# Patient Record
Sex: MALE | Race: WHITE | NOT HISPANIC OR LATINO
[De-identification: names, ages, dates, MRNs, and addresses within clinical notes are randomized per-mention and may not be internally consistent; named-entity substitution may affect disease eponyms.]

---

## 2017-03-02 PROBLEM — Z00.00 ENCOUNTER FOR PREVENTIVE HEALTH EXAMINATION: Status: ACTIVE | Noted: 2017-03-02

## 2017-03-06 ENCOUNTER — APPOINTMENT (OUTPATIENT)
Dept: ORTHOPEDIC SURGERY | Facility: CLINIC | Age: 57
End: 2017-03-06

## 2017-03-06 VITALS
DIASTOLIC BLOOD PRESSURE: 84 MMHG | WEIGHT: 210 LBS | BODY MASS INDEX: 31.1 KG/M2 | HEART RATE: 80 BPM | SYSTOLIC BLOOD PRESSURE: 121 MMHG | HEIGHT: 69 IN

## 2017-03-06 DIAGNOSIS — Z78.9 OTHER SPECIFIED HEALTH STATUS: ICD-10-CM

## 2017-03-06 DIAGNOSIS — Z80.0 FAMILY HISTORY OF MALIGNANT NEOPLASM OF DIGESTIVE ORGANS: ICD-10-CM

## 2017-03-06 DIAGNOSIS — Z87.09 PERSONAL HISTORY OF OTHER DISEASES OF THE RESPIRATORY SYSTEM: ICD-10-CM

## 2017-03-06 DIAGNOSIS — E78.00 PURE HYPERCHOLESTEROLEMIA, UNSPECIFIED: ICD-10-CM

## 2017-03-06 RX ORDER — OMEPRAZOLE MAGNESIUM 10 MG/1
GRANULE, DELAYED RELEASE ORAL
Refills: 0 | Status: ACTIVE | COMMUNITY

## 2017-03-06 RX ORDER — ALBUTEROL SULFATE 90 UG/1
108 (90 BASE) AEROSOL, METERED RESPIRATORY (INHALATION)
Refills: 0 | Status: ACTIVE | COMMUNITY

## 2017-03-06 RX ORDER — ATORVASTATIN CALCIUM 80 MG/1
TABLET, FILM COATED ORAL
Refills: 0 | Status: ACTIVE | COMMUNITY

## 2017-03-06 RX ORDER — FLUTICASONE FUROATE AND VILANTEROL TRIFENATATE 100; 25 UG/1; UG/1
100-25 POWDER RESPIRATORY (INHALATION)
Refills: 0 | Status: ACTIVE | COMMUNITY

## 2017-05-18 ENCOUNTER — APPOINTMENT (OUTPATIENT)
Dept: SURGERY | Facility: CLINIC | Age: 57
End: 2017-05-18

## 2017-05-18 VITALS
HEIGHT: 69 IN | RESPIRATION RATE: 15 BRPM | BODY MASS INDEX: 29.62 KG/M2 | DIASTOLIC BLOOD PRESSURE: 89 MMHG | OXYGEN SATURATION: 96 % | HEART RATE: 62 BPM | TEMPERATURE: 98.2 F | SYSTOLIC BLOOD PRESSURE: 131 MMHG | WEIGHT: 200 LBS

## 2017-05-18 DIAGNOSIS — J45.909 UNSPECIFIED ASTHMA, UNCOMPLICATED: ICD-10-CM

## 2017-05-18 DIAGNOSIS — R19.8 OTHER SPECIFIED SYMPTOMS AND SIGNS INVOLVING THE DIGESTIVE SYSTEM AND ABDOMEN: ICD-10-CM

## 2017-05-18 DIAGNOSIS — E78.00 PURE HYPERCHOLESTEROLEMIA, UNSPECIFIED: ICD-10-CM

## 2017-05-18 DIAGNOSIS — K64.2 THIRD DEGREE HEMORRHOIDS: ICD-10-CM

## 2017-05-18 RX ORDER — METHYLPREDNISOLONE 4 MG/1
4 TABLET ORAL
Qty: 1 | Refills: 1 | Status: DISCONTINUED | COMMUNITY
Start: 2017-03-06 | End: 2017-05-18

## 2017-08-29 ENCOUNTER — OUTPATIENT (OUTPATIENT)
Dept: OUTPATIENT SERVICES | Facility: HOSPITAL | Age: 57
LOS: 1 days | End: 2017-08-29
Payer: COMMERCIAL

## 2017-08-29 DIAGNOSIS — M54.16 RADICULOPATHY, LUMBAR REGION: ICD-10-CM

## 2017-08-29 PROCEDURE — 62323 NJX INTERLAMINAR LMBR/SAC: CPT

## 2017-08-29 PROCEDURE — 77003 FLUOROGUIDE FOR SPINE INJECT: CPT

## 2017-09-06 ENCOUNTER — APPOINTMENT (OUTPATIENT)
Dept: SURGERY | Facility: CLINIC | Age: 57
End: 2017-09-06

## 2017-09-28 ENCOUNTER — TRANSCRIPTION ENCOUNTER (OUTPATIENT)
Age: 57
End: 2017-09-28

## 2018-07-23 PROBLEM — K64.2 THIRD DEGREE HEMORRHOIDS: Status: ACTIVE | Noted: 2017-05-18

## 2020-05-20 ENCOUNTER — APPOINTMENT (OUTPATIENT)
Dept: OTOLARYNGOLOGY | Facility: CLINIC | Age: 60
End: 2020-05-20
Payer: COMMERCIAL

## 2020-05-20 VITALS
WEIGHT: 205 LBS | BODY MASS INDEX: 30.36 KG/M2 | DIASTOLIC BLOOD PRESSURE: 84 MMHG | HEIGHT: 69 IN | SYSTOLIC BLOOD PRESSURE: 138 MMHG | HEART RATE: 80 BPM

## 2020-05-20 PROCEDURE — 92557 COMPREHENSIVE HEARING TEST: CPT

## 2020-05-20 PROCEDURE — 99204 OFFICE O/P NEW MOD 45 MIN: CPT | Mod: 25

## 2020-05-20 NOTE — PHYSICAL EXAM
[Midline] : trachea located in midline position [Normal] : no rashes [de-identified] : b/l nathalian

## 2020-05-20 NOTE — HISTORY OF PRESENT ILLNESS
[Tinnitus] : tinnitus [No] : patient does not have a  history of radiation therapy [de-identified] : 58 yo male \par Patient complains of bilateral tinnitus x 1 week. Had COVID march 19, now is negative. States he was watching TV when the ringing started, describes as a constant high pitched ringing. Better when hes in a louder environment worse when its quite. Denies recent loud noise exposure. \par Pt has no ear pain, ear drainage, hearing loss, vertigo, nasal congestion, nasal discharge, epistaxis, sinus infections, facial pain, facial pressure, throat pain, dysphagia or fevers\par \par  [Anxiety] : no anxiety [Headache] : no headache [Dizziness] : no dizziness [Hearing Loss] : no hearing loss [Recurrent Otitis Media] : no recurrent otitis media [Otitis Media with Effusion] : no otitis media with effusion [Presbycusis] : no presbycusis [Congenital Ear Malformation] : no congenital ear malformation [Meniere Disease] : no Meniere disease [Otosclerosis] : no otosclerosis [Perilymphatic Fistula] : no perilymphatic fistula [Hypertension] : no hypertension [Loud Noise Exposure] : no history of loud noise exposure [Early Onset Hearing Loss] : no early onset hearing loss [Smoking] : no smoking [Stroke] : no stroke [Facial Pain] : no facial pain [Facial Pressure] : no facial pressure [Nasal Congestion] : no nasal congestion [Diplopia] : no diplopia [Ear Fullness] : no ear fullness [Allergic Rhinitis] : no allergic rhinitis [Maxillary Tooth Infection] : no maxillary tooth infection [Septal Deviation] : no septal deviation [Environmental Allergies] : no environmental allergies [Seasonal Allergies] : no seasonal allergies [Environmental Allergens] : no environmental allergens [Adenoidectomy] : no adenoidectomy [Nasal FB Removal] : no nasal foreign body removal [Allergies] : no allergies [Asthma] : no asthma [Neck Mass] : no neck mass [Neck Pain] : no neck pain [Chills] : no chills [Cold Intolerance] : no cold intolerance [Cough] : no cough [Fatigue] : no fatigue [Heat Intolerance] : no heat intolerance [Hyperthyroidism] : no hyperthyroidism [Sialadenitis] : no sialadenitis [Hodgkin Disease] : no hodgkin disease [Non-Hodgkin Lymphoma] : no non-hodgkin lymphoma [Tobacco Use] : no tobacco use [Alcohol Use] : no alcohol use [Graves Disease] : no graves disease [Thyroid Cancer] : no thyroid cancer

## 2020-05-20 NOTE — ASSESSMENT
[FreeTextEntry1] : Bilateral Tinnitus:\par -Hearing Test performed to evaluate the extent of hearing loss and to explain pt's symptoms-wnl\par Rec Arches vitamin tx and acupuncture\par stress reduction and avoid loud noises\par \par Wax:\par After informed verbal consent is obtained, cerumen is removed from the right and left  ear canal with a curette and suction.\par \par f/u 6 months

## 2021-01-15 ENCOUNTER — APPOINTMENT (OUTPATIENT)
Dept: OTOLARYNGOLOGY | Facility: CLINIC | Age: 61
End: 2021-01-15
Payer: COMMERCIAL

## 2021-01-15 VITALS — WEIGHT: 205 LBS | BODY MASS INDEX: 30.36 KG/M2 | HEIGHT: 69 IN | TEMPERATURE: 95.6 F

## 2021-01-15 DIAGNOSIS — H61.23 IMPACTED CERUMEN, BILATERAL: ICD-10-CM

## 2021-01-15 DIAGNOSIS — H69.83 OTHER SPECIFIED DISORDERS OF EUSTACHIAN TUBE, BILATERAL: ICD-10-CM

## 2021-01-15 DIAGNOSIS — H90.3 SENSORINEURAL HEARING LOSS, BILATERAL: ICD-10-CM

## 2021-01-15 DIAGNOSIS — H93.13 TINNITUS, BILATERAL: ICD-10-CM

## 2021-01-15 PROCEDURE — 99072 ADDL SUPL MATRL&STAF TM PHE: CPT

## 2021-01-15 PROCEDURE — 92557 COMPREHENSIVE HEARING TEST: CPT

## 2021-01-15 PROCEDURE — 99214 OFFICE O/P EST MOD 30 MIN: CPT | Mod: 25

## 2021-01-15 PROCEDURE — 92567 TYMPANOMETRY: CPT

## 2021-01-15 NOTE — HISTORY OF PRESENT ILLNESS
[de-identified] : co au ringing high pitch ring\par constant, denies hearing loss\par neg hx noise\par co excessive throat clearing no dysphagia or\par daily rilosec for gerd, no pnd

## 2021-01-15 NOTE — PHYSICAL EXAM
[Midline] : trachea located in midline position [Normal] : no rashes [de-identified] : millie cleared au

## 2021-01-15 NOTE — ASSESSMENT
[FreeTextEntry1] : cerumen cleared \par audio au sn loss 8000 hz source of tinnitus\par rec masking\par fu audio 1 y

## 2021-11-03 ENCOUNTER — APPOINTMENT (OUTPATIENT)
Dept: NEUROSURGERY | Facility: CLINIC | Age: 61
End: 2021-11-03
Payer: COMMERCIAL

## 2021-11-03 VITALS
TEMPERATURE: 97.3 F | WEIGHT: 210 LBS | HEART RATE: 76 BPM | BODY MASS INDEX: 31.1 KG/M2 | SYSTOLIC BLOOD PRESSURE: 145 MMHG | DIASTOLIC BLOOD PRESSURE: 94 MMHG | HEIGHT: 69 IN | OXYGEN SATURATION: 98 %

## 2021-11-03 PROCEDURE — 99203 OFFICE O/P NEW LOW 30 MIN: CPT

## 2021-11-03 NOTE — REASON FOR VISIT
[New Patient Visit] : a new patient visit [FreeTextEntry1] : previous lumbar laminectomy - persistent pain and right sciatica.

## 2021-11-03 NOTE — CONSULT LETTER
[Dear  ___] : Dear  [unfilled], [Courtesy Letter:] : I had the pleasure of seeing your patient, [unfilled], in my office today. [Sincerely,] : Sincerely, [FreeTextEntry2] : Chad Arauz MD\par [address] [place] [address], [address]\[place] [address] [FreeTextEntry1] : This very pleasant 61-year-old gentleman is seen today for persistent mechanical lower back pain associated with severe right-sided sciatica.  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  Around 2015 he began developing more significant lower back issues with episodes of sciatica.  He underwent appropriate conservative measures for an extended period of time including physical therapy, pain management, and strategic epidural steroid injections.  He was identified to have severe degenerative spondylotic changes at L4-5 and L5-S1 is the underlying cause of his symptoms.  Ultimately he underwent a minimally invasive right-sided hemilaminectomy to decompress L4-5 and L5-S1 at [facility] surgery in 2019.  Unfortunately, the patient has not received significant benefit from that surgery and has in fact some increased lower back pain.  The patient has recently been seen pain management and further epidural steroid injections have not had any effect.  The patient is now very limited in his standing and walking ability and endurance.  He has to sit and stand with a forward bend in his back in order to ambulate comfortably.  He denies any bowel or bladder dysfunction.\par \par I have reviewed with the patient directly his postoperative MRI scan which was performed back in February 2020 at [facility].  This identifies the hemilaminectomy on the right-hand side at L4-5 and L5-S1.  There is good central decompression.  Of most concern there is splaying of the facet joint at L4-5 and L5-S1.  There are significant Modic changes in the endplates of L4 and L5 suggestive of some dynamic instability.  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  The previous decompression did not extend lateral into the foramen sufficiently to decompress the nerves.\par \par On examination the patient is clearly in discomfort.  He sits forward in a bent posture.  He has a forward stooped position when standing.  The patient has radiating sciatic pain down the right leg down to the knee region.  Fortunately, there is good strength of plantar and dorsi flexion.  The patient indicates that along with the pain he will have intermittent numbness in a distribution involving the lateral calf and top of foot in particular.\par \par I have had an extended discussion with the patient regarding his treatment options.  Based on the imaging from over 18 months ago there is certainly persistent compression of the nerve roots bilaterally in the foramen despite the medial laminectomy approach.  I have pointed out that the only way to fully decompress the nerve roots in this spondylotic facet hypertrophy pattern is to do a facetectomy which would by definition because the patient's spine to be unstable.  The most appropriate surgical approach in my opinion would be to do a open transforaminal lumbar interbody fusion at both L4-5 and L5-S1 with pedicle screw and anil instrumentation in addition to bilateral total facetectomies to completely decompress the nerve roots.  The patient indicated good understanding of my descriptions and explanations.  Currently he would like to think a little bit more about his options and possibly seek an additional opinion.  I think that this is quite appropriate.  A prescription for an updated MRI scan has been provided in order to better understand the possible changes in anatomy over the last 18months and how that relates to his current symptoms before making a final surgery decision.  I will see the patient again after he is undergone further imaging.  Overall the patient anticipates that he would like to undergo surgery in the new year if he makes that decision.\par \par Thank you for very kindly including me in the evaluation and support of your patient.  Please do not hesitate to contact me should you have any questions or concerns regarding this evaluation, the patient's diagnosis, or the recommendation for consideration of decompression with fusion of the lumbar spine at L4-5 and L5-S1. [FreeTextEntry3] : Arturo Quiñones MD, PhD, FRCPSC \par Attending Neurosurgeon \par  of Neurosurgery \par [facility] \par [address], 2nd floor \[place] [address] \par Office: [phone] \par Fax: [phone]\par \par  [DrTano  ___] : Dr. BROWNING

## 2022-01-14 ENCOUNTER — APPOINTMENT (OUTPATIENT)
Dept: OTOLARYNGOLOGY | Facility: CLINIC | Age: 62
End: 2022-01-14

## 2022-03-07 ENCOUNTER — APPOINTMENT (OUTPATIENT)
Dept: NEUROSURGERY | Facility: CLINIC | Age: 62
End: 2022-03-07

## 2022-03-07 VITALS
DIASTOLIC BLOOD PRESSURE: 88 MMHG | WEIGHT: 210 LBS | BODY MASS INDEX: 31.1 KG/M2 | HEART RATE: 77 BPM | HEIGHT: 69 IN | TEMPERATURE: 97.8 F | OXYGEN SATURATION: 98 % | SYSTOLIC BLOOD PRESSURE: 155 MMHG

## 2022-03-07 PROCEDURE — XXXXX: CPT | Mod: 1L

## 2022-03-07 NOTE — CONSULT LETTER
[Dear  ___] : Dear  [unfilled], [Courtesy Letter:] : I had the pleasure of seeing your patient, [unfilled], in my office today. [Sincerely,] : Sincerely, [FreeTextEntry2] : Chad Arauz MD\par [address]\[place] [address] [FreeTextEntry3] : Arturo Quiñones MD, PhD, FRCPSC \par Attending Neurosurgeon \par  of Neurosurgery \par [facility] \par [address], [address]nd floor \[place] [address] \par Office: [phone] \par Fax: [phone]\par \par

## 2022-10-29 ENCOUNTER — NON-APPOINTMENT (OUTPATIENT)
Age: 62
End: 2022-10-29

## 2022-12-08 ENCOUNTER — APPOINTMENT (OUTPATIENT)
Dept: SURGERY | Facility: CLINIC | Age: 62
End: 2022-12-08

## 2022-12-08 VITALS
SYSTOLIC BLOOD PRESSURE: 147 MMHG | RESPIRATION RATE: 17 BRPM | WEIGHT: 210 LBS | OXYGEN SATURATION: 98 % | DIASTOLIC BLOOD PRESSURE: 79 MMHG | BODY MASS INDEX: 31.1 KG/M2 | TEMPERATURE: 96.8 F | HEIGHT: 69 IN | HEART RATE: 87 BPM

## 2022-12-08 DIAGNOSIS — K64.8 OTHER HEMORRHOIDS: ICD-10-CM

## 2022-12-08 DIAGNOSIS — R19.4 CHANGE IN BOWEL HABIT: ICD-10-CM

## 2022-12-08 DIAGNOSIS — Z63.5 DISRUPTION OF FAMILY BY SEPARATION AND DIVORCE: ICD-10-CM

## 2022-12-08 PROCEDURE — 46600 DIAGNOSTIC ANOSCOPY SPX: CPT

## 2022-12-08 RX ORDER — NIRMATRELVIR AND RITONAVIR 300-100 MG
20 X 150 MG & KIT ORAL
Qty: 30 | Refills: 0 | Status: DISCONTINUED | COMMUNITY
Start: 2022-10-30

## 2022-12-08 RX ORDER — LOSARTAN POTASSIUM 100 MG/1
100 TABLET, FILM COATED ORAL
Qty: 90 | Refills: 0 | Status: ACTIVE | COMMUNITY
Start: 2022-11-18

## 2022-12-08 RX ORDER — LOSARTAN POTASSIUM 50 MG/1
50 TABLET, FILM COATED ORAL
Qty: 90 | Refills: 0 | Status: DISCONTINUED | COMMUNITY
Start: 2022-02-03

## 2022-12-08 RX ORDER — EZETIMIBE 10 MG/1
10 TABLET ORAL
Qty: 90 | Refills: 0 | Status: ACTIVE | COMMUNITY
Start: 2022-03-01

## 2022-12-08 RX ORDER — DESONIDE 0.5 MG/G
0.05 OINTMENT TOPICAL
Qty: 15 | Refills: 0 | Status: ACTIVE | COMMUNITY
Start: 2022-11-18

## 2022-12-08 RX ORDER — ROSUVASTATIN CALCIUM 20 MG/1
20 TABLET, FILM COATED ORAL
Qty: 90 | Refills: 0 | Status: ACTIVE | COMMUNITY
Start: 2022-02-03

## 2022-12-08 SDOH — SOCIAL STABILITY - SOCIAL INSECURITY: DISRUPTION OF FAMILY BY SEPARATION AND DIVORCE: Z63.5

## 2022-12-08 NOTE — HISTORY OF PRESENT ILLNESS
[FreeTextEntry1] : Juan A is a 61 y/o male here for a consultation visit, possible hemorrhoids. \par \par Colonoscopy on 10/29/20 by Dr. Yanez - 1. Five sessile polyps ranging between 3-7mm in size were found in the ascending colon and at cecum; polypectomy was performed with a cold snare. 2. Three polyps removed with cold bx forceps;; two removed with cold snare. 3. Two sessile polyps ranging between 5-9mm in size were found at the hepatic flexure; polypectomy was performed with a cold snare. 4. Retroflexed views revealed Internal Grade I first degree hemorrhoids. Pathology - A. Colon, cecum polyps, biopsy: tubular adenomas (x3). B. Colon, hepatic flexure polyps, biopsy: tubular adenoma (x2). \par \par Last seen on 5/18/17 - Perianal inspection demonstrated prolapse of the hypertrophied papilla in the left lateral position. Digital exam revealed large internal hemorrhoids and hypertrophied papillae. This was confirmed on anoscopy. Patient with large prolapsing internal hemorrhoids and associated hypertrophied papillae. Unfortunately the only treatment option for this combined pathology is surgery and I reviewed the indications, risks, benefits, alternatives for hemorrhoidectomy including but not limited to bleeding, infection, recurrence, pain, tags, fissure, fistula, stenosis, and anal incontinence. All questions were answered. Since the patient has had a significant change in bowel habits I called Dr. Yanez to make arrangements for a colonoscopy prior to hemorrhoid surgery. Patient understands the need for the colonoscopy.\par \par Today pt reports no pain. Daily BMs, formed ("flat" shape), Metamucil daily with relief, doesn’t feel like pt had complete evacuation of stool for about an year, no straining, denies pain, no bleeding, no episodes of incontinence, and denies feeling swollen or prolapsed tissue. Does feel bloated.  Denies nausea and vomiting. Denies fever and chills. Good appetite. Not taking any anticoagulants.

## 2022-12-08 NOTE — PHYSICAL EXAM
[FreeTextEntry1] : Perianal inspection left posterior tag.  Digital exam hemorrhoid enlargement with hypertrophied papillae.  Anoscopy confirmed enlarged hemorrhoids with hypertrophied papillae.\par \par Anoscopy performed to evaluate internal hemorrhoids.  No sedation required.\par

## 2022-12-08 NOTE — ASSESSMENT
[FreeTextEntry1] : Patient has not had anal swelling bleeding or pain for at least a year or 2.  However, he has had a change in bowel habits.  His stool is thinner and flatter than it used to be.  He takes daily Metamucil and I suggested that he continue.  He has hemorrhoid enlargement.  Typically, enlarged hemorrhoids do not result in change in bowel habits  unless there is associated swelling and/or prolapse.  As stated, the patient's hemorrhoids are asymptomatic at this time.  Therefore, I recommended a follow-up colonoscopy because of the change in bowel habits.  The patient will contact Dr. Yanez to make arrangements for the colonoscopy.

## 2023-01-10 ENCOUNTER — NON-APPOINTMENT (OUTPATIENT)
Age: 63
End: 2023-01-10

## 2023-01-10 DIAGNOSIS — G89.29 OTHER CHRONIC PAIN: ICD-10-CM

## 2023-01-10 DIAGNOSIS — Z86.79 PERSONAL HISTORY OF OTHER DISEASES OF THE CIRCULATORY SYSTEM: ICD-10-CM

## 2023-01-10 DIAGNOSIS — Z79.891 LONG TERM (CURRENT) USE OF OPIATE ANALGESIC: ICD-10-CM

## 2023-01-10 DIAGNOSIS — Z87.39 PERSONAL HISTORY OF OTHER DISEASES OF THE MUSCULOSKELETAL SYSTEM AND CONNECTIVE TISSUE: ICD-10-CM

## 2023-01-10 DIAGNOSIS — Z87.19 PERSONAL HISTORY OF OTHER DISEASES OF THE DIGESTIVE SYSTEM: ICD-10-CM

## 2023-01-10 RX ORDER — LOSARTAN POTASSIUM 50 MG/1
50 TABLET, FILM COATED ORAL DAILY
Refills: 0 | Status: ACTIVE | COMMUNITY

## 2023-01-24 ENCOUNTER — APPOINTMENT (OUTPATIENT)
Dept: PAIN MANAGEMENT | Facility: CLINIC | Age: 63
End: 2023-01-24
Payer: COMMERCIAL

## 2023-01-24 VITALS — HEIGHT: 69 IN | BODY MASS INDEX: 30.36 KG/M2 | WEIGHT: 205 LBS

## 2023-01-24 PROCEDURE — 99213 OFFICE O/P EST LOW 20 MIN: CPT | Mod: 95

## 2023-01-24 NOTE — REASON FOR VISIT
[Follow-Up Visit] : a follow-up pain management visit [Home] : at home, [unfilled] , at the time of the visit. [Medical Office: ([facility])___] : at the medical office located in  [Patient] : the patient [Self] : self [FreeTextEntry2] : Back pain

## 2023-01-24 NOTE — DISCUSSION/SUMMARY
[Medication Risks Reviewed] : Medication risks reviewed [de-identified] : Prescriptions renewed. [place]obt[place] and [place]. SOAPP-R completed on chart. The patient's medications are documented to the best of their ability. Quality of life and functional ability improved on medications. The patient is showing no aberrant behavior or evidence of diversion. The patient was advised not to use narcotic medication while operating an automobile or heavy machinery due to potential sedation or dizziness. The patient was educated to the risks associated with potential opioid dependence and addiction. Urine toxicology screens as per office protocol. Use of multimodal analgesia used prn.\par Follow up one month.

## 2023-01-24 NOTE — HISTORY OF PRESENT ILLNESS
[Lower back] : lower back [Gradual] : gradual [5] : 5 [Burning] : burning [Dull/Aching] : dull/aching [Radiating] : radiating [Shooting] : shooting [Stabbing] : stabbing [Throbbing] : throbbing [Tingling] : tingling [Constant] : constant [Household chores] : household chores [Work] : work [Sleep] : sleep [Rest] : rest [Meds] : meds [Ice] : ice [Heat] : heat [Sitting] : sitting [Standing] : standing [Walking] : walking [Bending forward] : bending forward [Full time] : Work status: full time [Steroid] : Steroid [FreeTextEntry1] : Chronic back pain. Managing on current medication regimen. States exercising and stretching as tolerated. Continues working. States insurance issue with interventionl injections. Follows up with Dr Quiñones.  [FreeTextEntry7] : LUMBAR TO RIGHT THIGH NUMBNESS [] : Post Surgical Visit: no [de-identified] : 09/2019 [de-identified] : LUMBAR  [de-identified] : EPIDURAL

## 2023-02-21 ENCOUNTER — APPOINTMENT (OUTPATIENT)
Dept: PAIN MANAGEMENT | Facility: CLINIC | Age: 63
End: 2023-02-21
Payer: COMMERCIAL

## 2023-02-21 VITALS — BODY MASS INDEX: 30.36 KG/M2 | WEIGHT: 205 LBS | HEIGHT: 69 IN

## 2023-02-21 PROCEDURE — 99213 OFFICE O/P EST LOW 20 MIN: CPT

## 2023-02-21 NOTE — DISCUSSION/SUMMARY
[Medication Risks Reviewed] : Medication risks reviewed [de-identified] : Prescriptions renewed. [place]obt[place] and [place]. SOAPP-R completed on chart. The patient's medications are documented to the best of their ability. Quality of life and functional ability improved on medications. The patient is showing no aberrant behavior or evidence of diversion. The patient was advised not to use narcotic medication while operating an automobile or heavy machinery due to potential sedation or dizziness. The patient was educated to the risks associated with potential opioid dependence and addiction. Urine toxicology screens as per office protocol. Use of multimodal analgesia used prn.\par Follow up one month.

## 2023-02-21 NOTE — HISTORY OF PRESENT ILLNESS
[Lower back] : lower back [Gradual] : gradual [5] : 5 [Burning] : burning [Dull/Aching] : dull/aching [Radiating] : radiating [Shooting] : shooting [Stabbing] : stabbing [Throbbing] : throbbing [Tingling] : tingling [Intermittent] : intermittent [Household chores] : household chores [Work] : work [Sleep] : sleep [Rest] : rest [Meds] : meds [Extending back] : extending back [Ice] : ice [Heat] : heat [Sitting] : sitting [Standing] : standing [Walking] : walking [Full time] : Work status: full time [Steroid] : Steroid [FreeTextEntry1] : Chronic back pain. He continues working, pain meds effective. Exercises as tolerated. Insurance issue with interventional injections. He hopes to avoid further surgery.  [] : Post Surgical Visit: no [FreeTextEntry7] : TO RIGHT THIGH NUMBNESS/TINGLING  [de-identified] : LUMBAR  [de-identified] : EPIDURAL

## 2023-03-20 ENCOUNTER — APPOINTMENT (OUTPATIENT)
Dept: PAIN MANAGEMENT | Facility: CLINIC | Age: 63
End: 2023-03-20
Payer: COMMERCIAL

## 2023-03-20 VITALS — WEIGHT: 205 LBS | BODY MASS INDEX: 30.36 KG/M2 | HEIGHT: 69 IN

## 2023-03-20 DIAGNOSIS — G89.4 CHRONIC PAIN SYNDROME: ICD-10-CM

## 2023-03-20 PROCEDURE — 99213 OFFICE O/P EST LOW 20 MIN: CPT | Mod: 95

## 2023-03-20 NOTE — DISCUSSION/SUMMARY
[Medication Risks Reviewed] : Medication risks reviewed [de-identified] : Prescriptions renewed. [place]obt[place] and [place]. SOAPP-R completed on chart. The patient's medications are documented to the best of their ability. Quality of life and functional ability improved on medications. The patient is showing no aberrant behavior or evidence of diversion. The patient was advised not to use narcotic medication while operating an automobile or heavy machinery due to potential sedation or dizziness. The patient was educated to the risks associated with potential opioid dependence and addiction. Urine toxicology screens as per office protocol. Use of multimodal analgesia used prn.\par Follow up one month.

## 2023-03-20 NOTE — HISTORY OF PRESENT ILLNESS
[Lower back] : lower back [Gradual] : gradual [5] : 5 [Burning] : burning [Dull/Aching] : dull/aching [Radiating] : radiating [Shooting] : shooting [Stabbing] : stabbing [Throbbing] : throbbing [Tingling] : tingling [Intermittent] : intermittent [Household chores] : household chores [Work] : work [Sleep] : sleep [Rest] : rest [Meds] : meds [Extending back] : extending back [Ice] : ice [Heat] : heat [Sitting] : sitting [Standing] : standing [Walking] : walking [Full time] : Work status: full time [Steroid] : Steroid [FreeTextEntry1] : Chronic back pain. Varies with activity. Painmeds effective at times. He is considering weaning and assessing  his pain and functional ability. We discussed taking 1/2- 1 pill less on a good day  and evaluate  his response. He continues working.  [] : Post Surgical Visit: no [FreeTextEntry6] : RT SIDE SCIATICA  [FreeTextEntry7] : TO RIGHT THIGH NUMBNESS/TINGLING  [de-identified] : 01/2023 [de-identified] : HOME EXERCISES 3-4X /WEEK  [de-identified] : LUMBAR  [de-identified] : EPIDURAL

## 2023-03-20 NOTE — REASON FOR VISIT
[Follow-Up Visit] : a follow-up pain management visit [Home] : at home, [unfilled] , at the time of the visit. [Medical Office: ([facility])___] : at the medical office located in  [Patient] : the patient [Self] : self [FreeTextEntry2] : Chronic back pain

## 2023-04-17 ENCOUNTER — APPOINTMENT (OUTPATIENT)
Dept: PAIN MANAGEMENT | Facility: CLINIC | Age: 63
End: 2023-04-17
Payer: COMMERCIAL

## 2023-04-17 VITALS — WEIGHT: 210 LBS | BODY MASS INDEX: 31.1 KG/M2 | HEIGHT: 69 IN

## 2023-04-17 PROCEDURE — 99213 OFFICE O/P EST LOW 20 MIN: CPT | Mod: 95

## 2023-04-17 NOTE — REASON FOR VISIT
[Follow-Up Visit] : a follow-up pain management visit [Home] : at home, [unfilled] , at the time of the visit. [Medical Office: ([facility])___] : at the medical office located in  [Patient] : the patient [Self] : self [FreeTextEntry2] : Back pain

## 2023-04-17 NOTE — HISTORY OF PRESENT ILLNESS
[Lower back] : lower back [Gradual] : gradual [5] : 5 [Burning] : burning [Dull/Aching] : dull/aching [Radiating] : radiating [Shooting] : shooting [Stabbing] : stabbing [Throbbing] : throbbing [Tingling] : tingling [Intermittent] : intermittent [Household chores] : household chores [Work] : work [Sleep] : sleep [Rest] : rest [Meds] : meds [Extending back] : extending back [Ice] : ice [Heat] : heat [Sitting] : sitting [Standing] : standing [Walking] : walking [Full time] : Work status: full time [Steroid] : Steroid [FreeTextEntry1] : Back pain stable. States he is able to continue  working, exercises as tolerated. Pain meds and Advil prn effective.  [] : Post Surgical Visit: no [FreeTextEntry6] : RT SIDE SCIATICA  [de-identified] : 01/2023 [FreeTextEntry7] : TO RIGHT THIGH NUMBNESS/TINGLING  [de-identified] : 01/03/2023 [de-identified] :   PHYSICAL THERAPY STARTED IN 2023-01-03 PT WENT FOR A MONTH UNTIL 02- FOR ABOUT 2-3X WEEK ,  PT CONTINUING WITH A HOME EXERCISE PROGRAM, 3-4 X WEEK WITH STRETCHES , HELPING PT TEMPORARILY BY LOOSENING MUSCLES , AND GIVING MORE MOBILITY AND FLEXIBILITY  [de-identified] : LUMBAR  [de-identified] : EPIDURAL

## 2023-04-17 NOTE — DISCUSSION/SUMMARY
[Medication Risks Reviewed] : Medication risks reviewed [de-identified] : Prescriptions renewed. [place]obt[place] and [place]. SOAPP-R completed on chart. The patient's medications are documented to the best of their ability. Quality of life and functional ability improved on medications. The patient is showing no aberrant behavior or evidence of diversion. The patient was advised not to use narcotic medication while operating an automobile or heavy machinery due to potential sedation or dizziness. The patient was educated to the risks associated with potential opioid dependence and addiction. Urine toxicology screens as per office protocol. Use of multimodal analgesia used prn.\par Follow up one month.

## 2023-05-15 ENCOUNTER — APPOINTMENT (OUTPATIENT)
Dept: PAIN MANAGEMENT | Facility: CLINIC | Age: 63
End: 2023-05-15
Payer: COMMERCIAL

## 2023-05-15 PROCEDURE — 99213 OFFICE O/P EST LOW 20 MIN: CPT | Mod: 95

## 2023-05-15 RX ORDER — OXYCODONE 5 MG/1
5 TABLET ORAL
Qty: 120 | Refills: 0 | Status: DISCONTINUED | COMMUNITY
Start: 2023-03-20 | End: 2023-05-15

## 2023-05-15 RX ORDER — OXYCODONE 5 MG/1
5 TABLET ORAL 4 TIMES DAILY
Qty: 120 | Refills: 0 | Status: DISCONTINUED | COMMUNITY
End: 2023-05-15

## 2023-05-15 RX ORDER — OXYCODONE 5 MG/1
5 TABLET ORAL
Qty: 120 | Refills: 0 | Status: DISCONTINUED | COMMUNITY
Start: 2023-02-21 | End: 2023-05-15

## 2023-05-15 RX ORDER — OXYCODONE 5 MG/1
5 TABLET ORAL
Qty: 120 | Refills: 0 | Status: DISCONTINUED | COMMUNITY
Start: 2023-04-17 | End: 2023-05-15

## 2023-05-15 NOTE — REASON FOR VISIT
[Follow-Up Visit] : a follow-up pain management visit [Home] : at home, [unfilled] , at the time of the visit. [Medical Office: ([facility])___] : at the medical office located in  [Patient] : the patient [Self] : self [FreeTextEntry2] : MED REFILL

## 2023-05-15 NOTE — HISTORY OF PRESENT ILLNESS
[Lower back] : lower back [Gradual] : gradual [5] : 5 [Burning] : burning [Dull/Aching] : dull/aching [Radiating] : radiating [Shooting] : shooting [Stabbing] : stabbing [Tingling] : tingling [Throbbing] : throbbing [Intermittent] : intermittent [Household chores] : household chores [Work] : work [Sleep] : sleep [Rest] : rest [Meds] : meds [Extending back] : extending back [Heat] : heat [Ice] : ice [Sitting] : sitting [Standing] : standing [Walking] : walking [Full time] : Work status: full time [Steroid] : Steroid [FreeTextEntry1] : Chronic back pain stable.  Continues working, exercises as tolerated. Meds effective.  [] : Post Surgical Visit: no [FreeTextEntry6] : RT SIDE SCIATICA  [FreeTextEntry7] : TO RIGHT THIGH NUMBNESS/TINGLING  [de-identified] : 01/2023 [de-identified] : 01/03/2023 [de-identified] :   PHYSICAL THERAPY STARTED IN 2023-01-03 PT WENT FOR A MONTH UNTIL 02- FOR ABOUT 2-3X WEEK ,  PT CONTINUING WITH A HOME EXERCISE PROGRAM, 3-4 X WEEK WITH STRETCHES , HELPING PT TEMPORARILY BY LOOSENING MUSCLES , AND GIVING MORE MOBILITY AND FLEXIBILITY  [de-identified] : LUMBAR  [de-identified] : EPIDURAL

## 2023-05-15 NOTE — DISCUSSION/SUMMARY
[Medication Risks Reviewed] : Medication risks reviewed [de-identified] : Prescriptions renewed. [place]obt[place] and [place]. SOAPP-R completed on chart. The patient's medications are documented to the best of their ability. Quality of life and functional ability improved on medications. The patient is showing no aberrant behavior or evidence of diversion. The patient was advised not to use narcotic medication while operating an automobile or heavy machinery due to potential sedation or dizziness. The patient was educated to the risks associated with potential opioid dependence and addiction. Urine toxicology screens as per office protocol. Use of multimodal analgesia used prn.\par Follow up one month.

## 2023-06-12 ENCOUNTER — APPOINTMENT (OUTPATIENT)
Dept: PAIN MANAGEMENT | Facility: CLINIC | Age: 63
End: 2023-06-12
Payer: COMMERCIAL

## 2023-06-12 VITALS — WEIGHT: 210 LBS | HEIGHT: 69 IN | BODY MASS INDEX: 31.1 KG/M2

## 2023-06-12 PROCEDURE — 99213 OFFICE O/P EST LOW 20 MIN: CPT | Mod: 95

## 2023-06-12 NOTE — DISCUSSION/SUMMARY
[Medication Risks Reviewed] : Medication risks reviewed [de-identified] : Prescriptions renewed. [place]obt[place] and [place]. SOAPP-R completed on chart. The patient's medications are documented to the best of their ability. Quality of life and functional ability improved on medications. The patient is showing no aberrant behavior or evidence of diversion. The patient was advised not to use narcotic medication while operating an automobile or heavy machinery due to potential sedation or dizziness. The patient was educated to the risks associated with potential opioid dependence and addiction. Urine toxicology screens as per office protocol. Use of multimodal analgesia used prn.\par Follow up one month.

## 2023-06-12 NOTE — REASON FOR VISIT
[Follow-Up Visit] : a follow-up pain management visit [Home] : at home, [unfilled] , at the time of the visit. [Medical Office: ([facility])___] : at the medical office located in  [Patient] : the patient [Self] : self [FreeTextEntry2] : MED REFILL

## 2023-06-12 NOTE — HISTORY OF PRESENT ILLNESS
[Lower back] : lower back [Gradual] : gradual [5] : 5 [Burning] : burning [Dull/Aching] : dull/aching [Radiating] : radiating [Shooting] : shooting [Throbbing] : throbbing [Tingling] : tingling [Intermittent] : intermittent [Household chores] : household chores [Work] : work [Sleep] : sleep [Rest] : rest [Meds] : meds [Extending back] : extending back [Ice] : ice [Heat] : heat [Sitting] : sitting [Standing] : standing [Walking] : walking [Full time] : Work status: full time [Steroid] : Steroid [FreeTextEntry1] : Chronic back pain varies. Meds effective. He continues working full time. Exercises as tolerated.  [] : Post Surgical Visit: no [FreeTextEntry6] : RT SIDE SCIATICA  [FreeTextEntry7] : TO RIGHT THIGH NUMBNESS/TINGLING  [de-identified] : 01/2023 [de-identified] : 01/03/2023 [de-identified] :   PHYSICAL THERAPY STARTED IN 2023-01-03 PT WENT FOR A MONTH UNTIL 02- FOR ABOUT 2-3X WEEK ,  AS OFM 2023-06-12 CONFIRMED PT CONTINUING WITH A HOME EXERCISE PROGRAM, 3-4 X WEEK WITH STRETCHES , HELPING PT TEMPORARILY BY LOOSENING MUSCLES , AND GIVING MORE MOBILITY AND FLEXIBILITY

## 2023-06-26 ENCOUNTER — TRANSCRIPTION ENCOUNTER (OUTPATIENT)
Age: 63
End: 2023-06-26

## 2023-07-10 RX ORDER — OXYCODONE 5 MG/1
5 TABLET ORAL
Qty: 120 | Refills: 0 | Status: DISCONTINUED | COMMUNITY
Start: 2023-05-15 | End: 2023-07-10

## 2023-07-11 ENCOUNTER — APPOINTMENT (OUTPATIENT)
Dept: PAIN MANAGEMENT | Facility: CLINIC | Age: 63
End: 2023-07-11
Payer: COMMERCIAL

## 2023-07-11 VITALS — BODY MASS INDEX: 31.1 KG/M2 | WEIGHT: 210 LBS | HEIGHT: 69 IN

## 2023-07-11 PROCEDURE — 99213 OFFICE O/P EST LOW 20 MIN: CPT

## 2023-07-11 NOTE — HISTORY OF PRESENT ILLNESS
[Lower back] : lower back [Gradual] : gradual [5] : 5 [Burning] : burning [Dull/Aching] : dull/aching [Radiating] : radiating [Shooting] : shooting [Throbbing] : throbbing [Tingling] : tingling [Intermittent] : intermittent [Household chores] : household chores [Work] : work [Sleep] : sleep [Rest] : rest [Meds] : meds [Extending back] : extending back [Ice] : ice [Heat] : heat [Sitting] : sitting [Standing] : standing [Walking] : walking [Full time] : Work status: full time [Steroid] : Steroid [de-identified] : Chronic back pain. Exercises as tolerated. Pain meds effective. Continues working. [] : Post Surgical Visit: no [FreeTextEntry7] : TO RIGHT THIGH NUMBNESS/TINGLING  [FreeTextEntry6] : RT SIDE SCIATICA  [de-identified] : 01/2023 [de-identified] : 01/03/2023 [de-identified] :   PHYSICAL THERAPY STARTED IN 2023-01-03 PT WENT FOR A MONTH UNTIL 02- FOR ABOUT 2-3X WEEK ,  AS OFM 2023-07-11 CONFIRMED PT CONTINUING WITH A HOME EXERCISE PROGRAM, 3-4 X WEEK WITH STRETCHES , HELPING PT TEMPORARILY BY LOOSENING MUSCLES , AND GIVING MORE MOBILITY AND FLEXIBILITY

## 2023-07-11 NOTE — DISCUSSION/SUMMARY
[Medication Risks Reviewed] : Medication risks reviewed [de-identified] : Prescriptions renewed. [place] and [place]. SOAPP-R completed on chart. The patient's medications are documented to the best of their ability. Quality of life and functional ability improved on medications. The patient is showing no aberrant behavior or evidence of diversion. The patient was advised not to use narcotic medication while operating an automobile or heavy machinery due to potential sedation or dizziness. The patient was educated to the risks associated with potential opioid dependence and addiction. Urine toxicology screens as per office protocol. Use of multimodal analgesia used prn.\par Follow up one month.\par

## 2023-08-07 ENCOUNTER — APPOINTMENT (OUTPATIENT)
Dept: PAIN MANAGEMENT | Facility: CLINIC | Age: 63
End: 2023-08-07
Payer: COMMERCIAL

## 2023-08-07 VITALS — WEIGHT: 210 LBS | BODY MASS INDEX: 31.1 KG/M2 | HEIGHT: 69 IN

## 2023-08-07 PROCEDURE — 99213 OFFICE O/P EST LOW 20 MIN: CPT | Mod: 95

## 2023-08-07 NOTE — HISTORY OF PRESENT ILLNESS
[Lower back] : lower back [Gradual] : gradual [5] : 5 [Burning] : burning [Dull/Aching] : dull/aching [Radiating] : radiating [Shooting] : shooting [Throbbing] : throbbing [Tingling] : tingling [Intermittent] : intermittent [Household chores] : household chores [Work] : work [Sleep] : sleep [Rest] : rest [Meds] : meds [Extending back] : extending back [Ice] : ice [Heat] : heat [Sitting] : sitting [Standing] : standing [Walking] : walking [Full time] : Work status: full time [Steroid] : Steroid [de-identified] : Chronic back pain. Manages on current medication regimen,  Continues working and remains physically active.  [] : Post Surgical Visit: no [FreeTextEntry6] : RT SIDE SCIATICA  [FreeTextEntry7] : TO RIGHT THIGH NUMBNESS/TINGLING  [de-identified] : 01/2023 [de-identified] : 01/03/2023 [de-identified] :   PHYSICAL THERAPY STARTED IN 2023-01-03 PT WENT FOR A MONTH UNTIL 02- FOR ABOUT 2-3X WEEK ,  AS OFM 2023-08-07 CONFIRMED PT CONTINUING WITH A HOME EXERCISE PROGRAM, 3-4 X WEEK WITH STRETCHES , HELPING PT TEMPORARILY BY LOOSENING MUSCLES , AND GIVING MORE MOBILITY AND FLEXIBILITY

## 2023-08-07 NOTE — DISCUSSION/SUMMARY
[Medication Risks Reviewed] : Medication risks reviewed [de-identified] : Prescriptions renewed. Opioid agreement/obtained on chart [place]  reviewed and appropriate. SOAPP-R completed on chart. The patient's medications are documented to the best of their ability. Quality of life and functional ability improved on medications. The patient is showing no aberrant behavior or evidence of diversion. The patient was advised not to use narcotic medication while operating an automobile or heavy machinery due to potential sedation or dizziness. The patient was educated to the risks associated with potential opioid dependence and addiction. Urine toxicology screens as per office protocol. Use of multimodal analgesia used prn. Follow up one month.

## 2023-08-07 NOTE — REASON FOR VISIT
[Home] : at home, [unfilled] , at the time of the visit. [Medical Office: ([facility])___] : at the medical office located in  [Patient] : the patient [Self] : self [FreeTextEntry2] : med req

## 2023-09-06 ENCOUNTER — APPOINTMENT (OUTPATIENT)
Dept: PAIN MANAGEMENT | Facility: CLINIC | Age: 63
End: 2023-09-06
Payer: COMMERCIAL

## 2023-09-06 PROCEDURE — 99213 OFFICE O/P EST LOW 20 MIN: CPT | Mod: 95

## 2023-09-06 NOTE — HISTORY OF PRESENT ILLNESS
[Lower back] : lower back [Gradual] : gradual [5] : 5 [Burning] : burning [Dull/Aching] : dull/aching [Radiating] : radiating [Shooting] : shooting [Throbbing] : throbbing [Tingling] : tingling [Intermittent] : intermittent [Household chores] : household chores [Work] : work [Sleep] : sleep [Rest] : rest [Meds] : meds [Extending back] : extending back [Ice] : ice [Heat] : heat [Sitting] : sitting [Standing] : standing [Walking] : walking [Full time] : Work status: full time [Steroid] : Steroid [Home] : at home, [unfilled] , at the time of the visit. [Medical Office: ([facility])___] : at the medical office located in  [Verbal consent obtained from patient] : the patient, [unfilled] [] : Post Surgical Visit: no [FreeTextEntry6] : RT SIDE SCIATICA  [FreeTextEntry7] : TO RIGHT THIGH NUMBNESS/TINGLING  [de-identified] : 01/2023 [de-identified] : 01/03/2023 [de-identified] :   PHYSICAL THERAPY STARTED IN 2023-01-03 PT WENT FOR A MONTH UNTIL 02- FOR ABOUT 2-3X WEEK ,  AS OFM 2023-08-07 CONFIRMED PT CONTINUING WITH A HOME EXERCISE PROGRAM, 3-4 X WEEK WITH STRETCHES , HELPING PT TEMPORARILY BY LOOSENING MUSCLES , AND GIVING MORE MOBILITY AND FLEXIBILITY

## 2023-10-03 ENCOUNTER — APPOINTMENT (OUTPATIENT)
Dept: PAIN MANAGEMENT | Facility: CLINIC | Age: 63
End: 2023-10-03
Payer: COMMERCIAL

## 2023-10-03 VITALS — BODY MASS INDEX: 31.1 KG/M2 | HEIGHT: 69 IN | WEIGHT: 210 LBS

## 2023-10-03 PROCEDURE — 99213 OFFICE O/P EST LOW 20 MIN: CPT

## 2023-11-01 ENCOUNTER — APPOINTMENT (OUTPATIENT)
Dept: PAIN MANAGEMENT | Facility: CLINIC | Age: 63
End: 2023-11-01
Payer: COMMERCIAL

## 2023-11-01 PROCEDURE — 99213 OFFICE O/P EST LOW 20 MIN: CPT | Mod: 95

## 2023-11-29 ENCOUNTER — APPOINTMENT (OUTPATIENT)
Dept: PAIN MANAGEMENT | Facility: CLINIC | Age: 63
End: 2023-11-29
Payer: COMMERCIAL

## 2023-11-29 VITALS — WEIGHT: 210 LBS | BODY MASS INDEX: 31.1 KG/M2 | HEIGHT: 69 IN

## 2023-11-29 PROCEDURE — 99213 OFFICE O/P EST LOW 20 MIN: CPT | Mod: 95

## 2023-12-15 ENCOUNTER — APPOINTMENT (OUTPATIENT)
Dept: NEUROSURGERY | Facility: CLINIC | Age: 63
End: 2023-12-15
Payer: COMMERCIAL

## 2023-12-15 VITALS
SYSTOLIC BLOOD PRESSURE: 157 MMHG | HEIGHT: 69 IN | HEART RATE: 75 BPM | WEIGHT: 210 LBS | OXYGEN SATURATION: 96 % | BODY MASS INDEX: 31.1 KG/M2 | DIASTOLIC BLOOD PRESSURE: 90 MMHG

## 2023-12-15 PROCEDURE — 99214 OFFICE O/P EST MOD 30 MIN: CPT

## 2023-12-19 ENCOUNTER — APPOINTMENT (OUTPATIENT)
Dept: RADIOLOGY | Facility: CLINIC | Age: 63
End: 2023-12-19
Payer: COMMERCIAL

## 2023-12-19 ENCOUNTER — OUTPATIENT (OUTPATIENT)
Dept: OUTPATIENT SERVICES | Facility: HOSPITAL | Age: 63
LOS: 1 days | End: 2023-12-19
Payer: COMMERCIAL

## 2023-12-19 DIAGNOSIS — M54.50 LOW BACK PAIN, UNSPECIFIED: ICD-10-CM

## 2023-12-19 PROCEDURE — 72110 X-RAY EXAM L-2 SPINE 4/>VWS: CPT

## 2023-12-19 PROCEDURE — 72110 X-RAY EXAM L-2 SPINE 4/>VWS: CPT | Mod: 26

## 2023-12-19 NOTE — CONSULT LETTER
[Dear  ___] : Dear  [unfilled], [Courtesy Letter:] : I had the pleasure of seeing your patient, [unfilled], in my office today. [Sincerely,] : Sincerely, [FreeTextEntry2] : Chad Arauz MD  [address] [place] [address] [FreeTextEntry1] : Juan A Pal is a 63-year-old male who presents today with lower back symptoms.  Patient reports he has a history of a right hemilaminectomy at L4-5 and L5-S1 in 2019.  He did not obtain any benefit with this surgery.  He reports his symptoms started in 2015 without any specific event.  He reports lower back crepitus.  He reports a lower back soreness and ache.  He reports a constant numbness and tingling to his right anterior thigh which has been ongoing for many years.  Patient denies any lower extremity weakness.  Denies bowel or bladder dysfunction or saddle anesthesia.  Patient denies any walking difficulties or leg pain.   Sitting to standing positions worsens symptoms.  Patient stretches often.  He reports oxycodone provides him with some benefit.  He has tried epidural steroid injections in the past.  He is currently under the care of a pain management doctor.  Patient is alert and oriented.  No distress noted.  Strength to bilateral legs 5/5.  Negative clonus.  Reflexes to bilateral legs present and equal.  Sensation to light touch lower extremities equal and normal.  No walking difficulties noted.  I provided the patient with a prescription for an x-ray and MRI of the lumbar spine.  Provided patient with referral for physical therapy.  Patient will phone office once imaging are available however he has been scheduled an appointment with neurosurgeon in the event surgery is an option.  Patient aware to call with any further questions or concerns or with any new or worsening symptoms. [FreeTextEntry3] : Sabi Kerns, MSN, [facility] Nurse Practitioner Neurosurgery [address], [address] [place] [address]  Office: [phone]  Fax: [phone]

## 2023-12-27 ENCOUNTER — APPOINTMENT (OUTPATIENT)
Dept: PAIN MANAGEMENT | Facility: CLINIC | Age: 63
End: 2023-12-27
Payer: COMMERCIAL

## 2023-12-27 PROCEDURE — 99213 OFFICE O/P EST LOW 20 MIN: CPT | Mod: 95

## 2023-12-27 NOTE — DISCUSSION/SUMMARY
[Medication Risks Reviewed] : Medication risks reviewed [de-identified] : Prescriptions renewed. Opioid agreement/obtained on chart [place]  reviewed and appropriate. SOAPP-R completed on chart. The patient's medications are documented to the best of their ability. Quality of life and functional ability improved on medications. The patient is showing no aberrant behavior or evidence of diversion. The patient was advised not to use narcotic medication while operating an automobile or heavy machinery due to potential sedation or dizziness. The patient was educated to the risks associated with potential opioid dependence and addiction. Urine toxicology screens as per office protocol. Use of multimodal analgesia used prn. Follow up one month.

## 2023-12-27 NOTE — HISTORY OF PRESENT ILLNESS
[Lower back] : lower back [Gradual] : gradual [6] : 6 [4] : 4 [Burning] : burning [Dull/Aching] : dull/aching [Radiating] : radiating [Shooting] : shooting [Throbbing] : throbbing [Tingling] : tingling [Intermittent] : intermittent [Household chores] : household chores [Work] : work [Sleep] : sleep [Rest] : rest [Meds] : meds [Extending back] : extending back [Ice] : ice [Heat] : heat [Sitting] : sitting [Standing] : standing [Walking] : walking [Full time] : Work status: full time [Steroid] : Steroid [FreeTextEntry1] : States. the clicking to his lower back. persists and was seen in Dr Quiñones's office. X-rays performed and awaiting. auth. for a new Lumbar MRI. Pain meds effective prn.  [] : Post Surgical Visit: no [FreeTextEntry6] : RT SIDE SCIATICA  [FreeTextEntry7] : TO RIGHT THIGH NUMBNESS/TINGLING  [de-identified] : 01/2023 [de-identified] : 01/03/2023 [de-identified] : XRAY LOWER BACK  [de-identified] :     AS OF 2023-12-27 CONFIRMED PT CONTINUING WITH A HOME EXERCISE PROGRAM, 3-4 X WEEK WITH STRETCHES , HELPING PT TEMPORARILY BY LOOSENING MUSCLES , AND GIVING MORE MOBILITY AND FLEXIBILITY

## 2023-12-27 NOTE — REASON FOR VISIT
[Follow-Up Visit] : a follow-up pain management visit [Home] : at home, [unfilled] , at the time of the visit. [Medical Office: ([facility])___] : at the medical office located in  [Patient] : the patient [Self] : self [FreeTextEntry2] : med refill

## 2024-01-06 ENCOUNTER — OUTPATIENT (OUTPATIENT)
Dept: OUTPATIENT SERVICES | Facility: HOSPITAL | Age: 64
LOS: 1 days | End: 2024-01-06
Payer: COMMERCIAL

## 2024-01-06 ENCOUNTER — APPOINTMENT (OUTPATIENT)
Dept: MRI IMAGING | Facility: CLINIC | Age: 64
End: 2024-01-06
Payer: COMMERCIAL

## 2024-01-06 DIAGNOSIS — M54.16 RADICULOPATHY, LUMBAR REGION: ICD-10-CM

## 2024-01-06 DIAGNOSIS — Z00.8 ENCOUNTER FOR OTHER GENERAL EXAMINATION: ICD-10-CM

## 2024-01-06 PROCEDURE — 72148 MRI LUMBAR SPINE W/O DYE: CPT

## 2024-01-06 PROCEDURE — 72148 MRI LUMBAR SPINE W/O DYE: CPT | Mod: 26

## 2024-01-08 ENCOUNTER — APPOINTMENT (OUTPATIENT)
Dept: PAIN MANAGEMENT | Facility: CLINIC | Age: 64
End: 2024-01-08
Payer: COMMERCIAL

## 2024-01-08 VITALS — WEIGHT: 210 LBS | BODY MASS INDEX: 31.1 KG/M2 | HEIGHT: 69 IN

## 2024-01-08 DIAGNOSIS — M25.531 PAIN IN RIGHT WRIST: ICD-10-CM

## 2024-01-08 PROCEDURE — 99213 OFFICE O/P EST LOW 20 MIN: CPT

## 2024-01-08 NOTE — HISTORY OF PRESENT ILLNESS
[FreeTextEntry1] : THE PATIENT IS 63 year OLD RT HAND DOMINANT male  WHO PRESENT TODAY IN OFFICE WITH  SWOLLEN RT WRIST PAIN, WITH AN WRIST BRACE      DATE OF INJURY/ONSET: 1/5/2023       PAIN: AT REST: 9/10       WTIH ACTIVITY: 10/10     MECHANISM OF INJURY: UNKNOWN INJURY     QUALITY OF SYMPTOMS:  THROBBING      IMPROVES WITH:  ICE      WORSE WITH:  ALL TYPE OF ROM      PRIOR TREATMENT:  PT STATES WENT TO URGENT CARE 1/7/2023 PT HAD XRAY COMPLETED NO FRACTURE, POSSIBLE TENDON ISSUE        PRIOR IMAGING:

## 2024-01-08 NOTE — ASSESSMENT
[FreeTextEntry1] : Interim history Patient presents the office with the onset of acute swelling and pain in his right wrist.  He states he does not remember any specific episode that precipitated the issue.  It developed slowly over 2 or 3 days and has become significant.  Patient went to urgent care 2 days ago x-rays were taken with no fracture being found patient was sent home with anti-inflammatories.  Patient has no history of these type of symptoms in the past.  He presents for evaluation of this issue. Objective Patient has marked swelling of the right thumb at the MCP joint.  Pressure on which reproduces the patient's pain complaint.  The patient has swelling of his whole hand and wrist to a lesser degrees.  There is no evidence of infection or a entry wound in the area. Assessment Acute inflammatory process of the right wrist with no history of similar issues. Plan Patient will follow-up with his primary care today to determine whether this is a gout or gout like issue.  I expressed the patient's important to find the diagnosis of this to prevent recurrence of this he states understanding and will be going to his primary care immediately after this visit.

## 2024-01-26 ENCOUNTER — APPOINTMENT (OUTPATIENT)
Dept: NEUROSURGERY | Facility: CLINIC | Age: 64
End: 2024-01-26
Payer: COMMERCIAL

## 2024-01-26 VITALS — HEART RATE: 81 BPM | DIASTOLIC BLOOD PRESSURE: 71 MMHG | OXYGEN SATURATION: 97 % | SYSTOLIC BLOOD PRESSURE: 152 MMHG

## 2024-01-26 DIAGNOSIS — M54.31 SCIATICA, RIGHT SIDE: ICD-10-CM

## 2024-01-26 DIAGNOSIS — Z98.890 OTHER SPECIFIED POSTPROCEDURAL STATES: ICD-10-CM

## 2024-01-26 DIAGNOSIS — M48.07 SPINAL STENOSIS, LUMBOSACRAL REGION: ICD-10-CM

## 2024-01-26 PROCEDURE — 99213 OFFICE O/P EST LOW 20 MIN: CPT

## 2024-01-29 ENCOUNTER — APPOINTMENT (OUTPATIENT)
Dept: PAIN MANAGEMENT | Facility: CLINIC | Age: 64
End: 2024-01-29
Payer: COMMERCIAL

## 2024-01-29 PROCEDURE — 99213 OFFICE O/P EST LOW 20 MIN: CPT | Mod: 95

## 2024-01-29 NOTE — HISTORY OF PRESENT ILLNESS
[Lower back] : lower back [Gradual] : gradual [5] : 5 [Burning] : burning [Dull/Aching] : dull/aching [Radiating] : radiating [Shooting] : shooting [Throbbing] : throbbing [Tingling] : tingling [Intermittent] : intermittent [Household chores] : household chores [Work] : work [Sleep] : sleep [Rest] : rest [Meds] : meds [Extending back] : extending back [Ice] : ice [Heat] : heat [Sitting] : sitting [Standing] : standing [Walking] : walking [Full time] : Work status: full time [Steroid] : Steroid [Home] : at home, [unfilled] , at the time of the visit. [Medical Office: ([facility])___] : at the medical office located in  [Verbal consent obtained from patient] : the patient, [unfilled] [FreeTextEntry1] : TIFFANIE CAMARGO IS FOLLOWING UP FOR PAIN MED REFILL, THERE HAS NOT BEEN CHANGES IN PAIN SINCE LAST VISIT.   LAST UDS: ?      DATE OF INJURY/ONSET: 1/5/2023       PAIN: AT REST: 9/10       WTIH ACTIVITY: 10/10     MECHANISM OF INJURY: UNKNOWN INJURY     QUALITY OF SYMPTOMS:  THROBBING      IMPROVES WITH:  ICE      WORSE WITH:  ALL TYPE OF ROM      PRIOR TREATMENT:  PT STATES WENT TO URGENT CARE 1/7/2023 PT HAD XRAY COMPLETED NO FRACTURE, POSSIBLE TENDON ISSUE        PRIOR IMAGING:     [] : Post Surgical Visit: no [FreeTextEntry6] : RT SIDE SCIATICA  [FreeTextEntry7] : TO RIGHT THIGH NUMBNESS/TINGLING  [de-identified] : 01/2023 [de-identified] : 01/03/2023 [de-identified] :   PHYSICAL THERAPY STARTED IN 2023-01-03 PT WENT FOR A MONTH UNTIL 02- FOR ABOUT 2-3X WEEK ,  AS OFM 2023-08-07 CONFIRMED PT CONTINUING WITH A HOME EXERCISE PROGRAM, 3-4 X WEEK WITH STRETCHES , HELPING PT TEMPORARILY BY LOOSENING MUSCLES , AND GIVING MORE MOBILITY AND FLEXIBILITY

## 2024-02-23 ENCOUNTER — APPOINTMENT (OUTPATIENT)
Dept: PAIN MANAGEMENT | Facility: CLINIC | Age: 64
End: 2024-02-23
Payer: COMMERCIAL

## 2024-02-23 PROCEDURE — 99213 OFFICE O/P EST LOW 20 MIN: CPT

## 2024-02-23 RX ORDER — NALOXONE HYDROCHLORIDE 4 MG/.1ML
4 SPRAY NASAL
Qty: 1 | Refills: 0 | Status: ACTIVE | COMMUNITY
Start: 2023-09-06 | End: 1900-01-01

## 2024-02-23 NOTE — HISTORY OF PRESENT ILLNESS
[Lower back] : lower back [Gradual] : gradual [5] : 5 [Burning] : burning [Dull/Aching] : dull/aching [Radiating] : radiating [Shooting] : shooting [Throbbing] : throbbing [Tingling] : tingling [Intermittent] : intermittent [Household chores] : household chores [Work] : work [Sleep] : sleep [Rest] : rest [Meds] : meds [Extending back] : extending back [Ice] : ice [Heat] : heat [Sitting] : sitting [Standing] : standing [Walking] : walking [Full time] : Work status: full time [Steroid] : Steroid [Medical Office: ([facility])___] : at the medical office located in  [Home] : at home, [unfilled] , at the time of the visit. [FreeTextEntry1] : TIFFANIE CAMARGO IS FOLLOWING UP FOR PAIN MED REFILL, THERE HAS NOT BEEN CHANGES IN PAIN SINCE LAST VISIT.   LAST UDS: 10/03/2023     DATE OF INJURY/ONSET: 1/5/2023     PAIN LEVEL;  W MED: 5 W/ O: 8   MECHANISM OF INJURY: UNKNOWN INJURY     QUALITY OF SYMPTOMS:  THROBBING      IMPROVES WITH:  ICE      WORSE WITH:  ALL TYPE OF ROM      PRIOR TREATMENT:  PT STATES WENT TO URGENT CARE 1/7/2023 PT HAD XRAY COMPLETED NO FRACTURE, POSSIBLE TENDON ISSUE        PRIOR IMAGING:     [Verbal consent obtained from patient] : the patient, [unfilled] [] : Post Surgical Visit: no [FreeTextEntry6] : RT SIDE SCIATICA  [de-identified] : 01/03/2023 [FreeTextEntry7] : TO RIGHT THIGH NUMBNESS/TINGLING  [de-identified] : 01/2023 [de-identified] :   PHYSICAL THERAPY STARTED IN 2023-01-03 PT WENT FOR A MONTH UNTIL 02- FOR ABOUT 2-3X WEEK ,  AS OFM 2023-08-07 CONFIRMED PT CONTINUING WITH A HOME EXERCISE PROGRAM, 3-4 X WEEK WITH STRETCHES , HELPING PT TEMPORARILY BY LOOSENING MUSCLES , AND GIVING MORE MOBILITY AND FLEXIBILITY

## 2024-02-27 NOTE — ASSESSMENT
[FreeTextEntry1] : Alert and oriented  X 3. Since last visit there are no changes to the patient's physical status.\par 
Universal Safety Interventions

## 2024-03-22 ENCOUNTER — APPOINTMENT (OUTPATIENT)
Dept: PAIN MANAGEMENT | Facility: CLINIC | Age: 64
End: 2024-03-22
Payer: COMMERCIAL

## 2024-03-22 PROCEDURE — 99213 OFFICE O/P EST LOW 20 MIN: CPT

## 2024-03-22 NOTE — HISTORY OF PRESENT ILLNESS
[Lower back] : lower back [Gradual] : gradual [5] : 5 [Burning] : burning [Dull/Aching] : dull/aching [Radiating] : radiating [Shooting] : shooting [Throbbing] : throbbing [Tingling] : tingling [Intermittent] : intermittent [Household chores] : household chores [Work] : work [Sleep] : sleep [Rest] : rest [Meds] : meds [Extending back] : extending back [Ice] : ice [Heat] : heat [Sitting] : sitting [Standing] : standing [Walking] : walking [Full time] : Work status: full time [Steroid] : Steroid [FreeTextEntry1] : TIFFANIE CAMARGO IS FOLLOWING UP FOR PAIN MED REFILL, THERE HAS NOT BEEN CHANGES IN PAIN SINCE LAST VISIT.   LAST UDS: 10/03/2023     DATE OF INJURY/ONSET: 1/5/2023     PAIN LEVEL;  W MED: 5 W/ O: 8   MECHANISM OF INJURY: UNKNOWN INJURY     QUALITY OF SYMPTOMS:  THROBBING      IMPROVES WITH:  ICE      WORSE WITH:  ALL TYPE OF ROM      PRIOR TREATMENT:  PT STATES WENT TO URGENT CARE 1/7/2023 PT HAD XRAY COMPLETED NO FRACTURE, POSSIBLE TENDON ISSUE        PRIOR IMAGING:     [] : no [FreeTextEntry6] : RT SIDE SCIATICA  [FreeTextEntry7] : TO RIGHT THIGH NUMBNESS/TINGLING  [de-identified] : 01/03/2023 [de-identified] :   PHYSICAL THERAPY STARTED IN 2023-01-03 PT WENT FOR A MONTH UNTIL 02- FOR ABOUT 2-3X WEEK ,  AS OFM 2023-08-07 CONFIRMED PT CONTINUING WITH A HOME EXERCISE PROGRAM, 3-4 X WEEK WITH STRETCHES , HELPING PT TEMPORARILY BY LOOSENING MUSCLES , AND GIVING MORE MOBILITY AND FLEXIBILITY  [de-identified] : 01/2023

## 2024-04-19 ENCOUNTER — APPOINTMENT (OUTPATIENT)
Dept: PAIN MANAGEMENT | Facility: CLINIC | Age: 64
End: 2024-04-19
Payer: COMMERCIAL

## 2024-04-19 PROCEDURE — 99213 OFFICE O/P EST LOW 20 MIN: CPT

## 2024-05-06 PROBLEM — Z98.890 STATUS POST LUMBAR LAMINECTOMY: Status: ACTIVE | Noted: 2021-11-03

## 2024-05-06 PROBLEM — M48.07 LUMBOSACRAL STENOSIS: Status: ACTIVE | Noted: 2017-03-06

## 2024-05-06 NOTE — CONSULT LETTER
[Dear  ___] : Dear  [unfilled], [Courtesy Letter:] : I had the pleasure of seeing your patient, [unfilled], in my office today. [Sincerely,] : Sincerely, [FreeTextEntry2] : Chad Arauz MD [address] [place] [address] [FreeTextEntry1] : This very pleasant 63-year-old gentleman is seen in our office for further follow-up and evaluation of lower back pain with radiation to the right thigh.  You may recall that the patient has undergone previous right-sided hemilaminectomy at [facility] in 2019 to decompress the L4-5 and L5-S1 levels.  The patient did not receive significant benefit from that procedure and he has continued to be followed by pain management and required intermittent ongoing epidural steroid injections for symptom control.  He reports his symptoms are present constantly radiating to the right leg and thigh region in particular.  Standing, walking, and mechanical range of motion efforts increases pain.  I have reviewed with the patient once again his MRI images from [facility] performed back in March 2022 and the more recent imaging at [facility] in January of this year.  These identify spondylotic degenerative changes and operative findings at L4-5 and L5-S1.  There is bilateral facet arthropathy and foraminal stenosis but it is more prominent on the right-hand side.  In addition there is evidence of a far lateral disc herniation on the right-hand side at L2-3 which would correlate well with the patient's newer symptoms of right thigh pain.  There is no change in the patient's neurologic examination since our previous assessment just 2 weeks ago.  I have had an extended discussion with the patient regarding his imaging findings and symptom profile.  The patient is certainly committed to nonsurgical interventions.  Based on the progressive spondylotic extensive changes decompression and fusion extending from L4 to the sacrum as well as a decompressive intervention at the L2-3 level would be appropriate.  However, further strategic epidural injections or facet blocks at the involved levels may provide ongoing temporizing relief.  Focused and strategic physical therapy including traction modalities may also provide ongoing benefit.  I would like to see the patient again in 6 months to evaluate his ongoing stability and response to conservative measures.  If the patient develops weakness or sensory changes then the patient is well-informed regarding an earlier investigation and consideration of surgical intervention. [FreeTextEntry3] : Arturo Quiñones MD, PhD, FRCPSC                            Attending Neurosurgeon   of Neurosurgery  [facility]  [address], [address]nd [place] [address]  Office: [phone]  Fax: [phone]

## 2024-05-17 ENCOUNTER — APPOINTMENT (OUTPATIENT)
Dept: PAIN MANAGEMENT | Facility: CLINIC | Age: 64
End: 2024-05-17
Payer: COMMERCIAL

## 2024-05-17 DIAGNOSIS — M54.12 RADICULOPATHY, CERVICAL REGION: ICD-10-CM

## 2024-05-17 PROCEDURE — 99213 OFFICE O/P EST LOW 20 MIN: CPT

## 2024-06-14 ENCOUNTER — APPOINTMENT (OUTPATIENT)
Dept: PAIN MANAGEMENT | Facility: CLINIC | Age: 64
End: 2024-06-14
Payer: COMMERCIAL

## 2024-06-14 PROCEDURE — 99213 OFFICE O/P EST LOW 20 MIN: CPT

## 2024-06-14 RX ORDER — OXYCODONE 5 MG/1
5 TABLET ORAL
Qty: 120 | Refills: 0 | Status: ACTIVE | COMMUNITY
Start: 2023-06-12 | End: 1900-01-01

## 2024-06-14 NOTE — HISTORY OF PRESENT ILLNESS
[Full time] : Work status: full time [Home] : at home, [unfilled] , at the time of the visit. [Medical Office: ([facility])___] : at the medical office located in  [Verbal consent obtained from patient] : the patient, [unfilled] [FreeTextEntry1] : TIFFANIE CAMARGO IS FOLLOWING UP FOR PAIN MED REFILL, THERE HAS NOT BEEN CHANGES IN PAIN SINCE LAST VISIT.   LAST UDS: 10/03/2023     DATE OF INJURY/ONSET: 1/5/2023     PAIN LEVEL;  W MED: 5 W/ O: 8   MECHANISM OF INJURY: UNKNOWN INJURY     QUALITY OF SYMPTOMS:  THROBBING      IMPROVES WITH:  ICE      WORSE WITH:  ALL TYPE OF ROM      PRIOR TREATMENT:  PT STATES WENT TO URGENT CARE 1/7/2023 PT HAD XRAY COMPLETED NO FRACTURE, POSSIBLE TENDON ISSUE        PRIOR IMAGING:     [] : Patient is currently playing sports: no

## 2024-07-01 ENCOUNTER — APPOINTMENT (OUTPATIENT)
Dept: NEUROSURGERY | Facility: CLINIC | Age: 64
End: 2024-07-01
Payer: COMMERCIAL

## 2024-07-01 VITALS
WEIGHT: 205 LBS | OXYGEN SATURATION: 96 % | BODY MASS INDEX: 30.36 KG/M2 | HEART RATE: 74 BPM | SYSTOLIC BLOOD PRESSURE: 142 MMHG | HEIGHT: 69 IN | DIASTOLIC BLOOD PRESSURE: 81 MMHG

## 2024-07-01 DIAGNOSIS — M54.50 LOW BACK PAIN, UNSPECIFIED: ICD-10-CM

## 2024-07-01 DIAGNOSIS — M47.27 OTHER SPONDYLOSIS WITH RADICULOPATHY, LUMBOSACRAL REGION: ICD-10-CM

## 2024-07-01 DIAGNOSIS — G89.29 LOW BACK PAIN, UNSPECIFIED: ICD-10-CM

## 2024-07-01 PROCEDURE — 99213 OFFICE O/P EST LOW 20 MIN: CPT

## 2024-07-02 PROBLEM — M54.16 LUMBAR RADICULOPATHY: Status: ACTIVE | Noted: 2017-03-06

## 2024-07-02 PROBLEM — M54.50 CHRONIC BILATERAL LOW BACK PAIN, UNSPECIFIED WHETHER SCIATICA PRESENT: Status: ACTIVE | Noted: 2022-03-07

## 2024-07-02 PROBLEM — M47.27 LUMBOSACRAL SPONDYLOSIS WITH RADICULOPATHY: Status: ACTIVE | Noted: 2021-11-03

## 2024-07-15 ENCOUNTER — APPOINTMENT (OUTPATIENT)
Dept: PAIN MANAGEMENT | Facility: CLINIC | Age: 64
End: 2024-07-15
Payer: COMMERCIAL

## 2024-07-15 DIAGNOSIS — M54.16 RADICULOPATHY, LUMBAR REGION: ICD-10-CM

## 2024-07-15 PROCEDURE — 99213 OFFICE O/P EST LOW 20 MIN: CPT

## 2024-07-16 ENCOUNTER — APPOINTMENT (OUTPATIENT)
Dept: PAIN MANAGEMENT | Facility: CLINIC | Age: 64
End: 2024-07-16
Payer: COMMERCIAL

## 2024-07-16 VITALS — BODY MASS INDEX: 30.36 KG/M2 | WEIGHT: 205 LBS | HEIGHT: 69 IN

## 2024-07-16 DIAGNOSIS — M79.18 MYALGIA, OTHER SITE: ICD-10-CM

## 2024-07-16 PROCEDURE — 20553 NJX 1/MLT TRIGGER POINTS 3/>: CPT

## 2024-07-30 ENCOUNTER — APPOINTMENT (OUTPATIENT)
Dept: PAIN MANAGEMENT | Facility: CLINIC | Age: 64
End: 2024-07-30
Payer: COMMERCIAL

## 2024-07-30 DIAGNOSIS — M79.18 MYALGIA, OTHER SITE: ICD-10-CM

## 2024-07-30 PROCEDURE — 20553 NJX 1/MLT TRIGGER POINTS 3/>: CPT

## 2024-07-30 NOTE — HISTORY OF PRESENT ILLNESS
[Full time] : Work status: full time [FreeTextEntry1] : TIFFANIE CAMARGO IS FOLLOWING UP FOR TRIGGER POINT INJ   LAST UDS: 03.22.2024     DATE OF INJURY/ONSET: 1/5/2023     PAIN LEVEL;  W MED: 5 W/ O: 8   MECHANISM OF INJURY: UNKNOWN INJURY     QUALITY OF SYMPTOMS:  THROBBING      IMPROVES WITH:  ICE      WORSE WITH:  ALL TYPE OF ROM      PRIOR TREATMENT:  PT STATES WENT TO URGENT CARE 1/7/2023 PT HAD XRAY COMPLETED NO FRACTURE, POSSIBLE TENDON ISSUE        PRIOR IMAGING:     [] : Patient is currently playing sports: no

## 2024-07-30 NOTE — ASSESSMENT
[FreeTextEntry1] : Interim history The patient returns to the office with pain complaints that we have treated successfully in the past with trigger point injections. The patient states that the pains are the in the same locations and feel the same as they have in the past. The patient denies new symptoms. Objective findings The patient has multiple areas of tenderness. Pressure on which recreated the patient's pain complaints. The are no additional changes in the patient's physical status Plan After a sterile alcohol prep and per office protocol, the patient is given 1 trigger point injections.  The area injected was in the paraspinous muscle at approximately T12 level on the right side these were performed through a 27 gauge needle and 0.25% bupivacaine was used as the injectate. The patient tolerated the procedures without incident.  This note was generated by using Dragon medical dictation software.  A reasonable effort has been made for proofreading its contents, but typos may still remain.  If there are any questions or points of clarification needed, please notify my office.

## 2024-08-16 ENCOUNTER — APPOINTMENT (OUTPATIENT)
Dept: PAIN MANAGEMENT | Facility: CLINIC | Age: 64
End: 2024-08-16

## 2024-08-16 DIAGNOSIS — M54.16 RADICULOPATHY, LUMBAR REGION: ICD-10-CM

## 2024-08-16 PROCEDURE — 99213 OFFICE O/P EST LOW 20 MIN: CPT

## 2024-08-16 NOTE — HISTORY OF PRESENT ILLNESS
[Full time] : Work status: full time [FreeTextEntry1] : TIFFANIE CAMARGO IS FOLLOWING UP FOR PAIN MED REFILL, PT ADL'S INCREASE WITH MEDICATION PT IS MORE ACTIVE IN GENERAL AND HAS IMRPOVED QUALITY OF LIFE. PT IS ABLE TO COMPLETE ALL TYPE OF ADL     LAST UDS: 7/16/2024  PAIN LEVEL:8-10/10   DATE OF INJURY/ONSET: 1/5/2023    MECHANISM OF INJURY: UNKNOWN INJURY     QUALITY OF SYMPTOMS:  THROBBING      IMPROVES WITH:  ICE      WORSE WITH:  ALL TYPE OF ROM      PRIOR TREATMENT:  PT STATES WENT TO URGENT CARE 1/7/2023 PT HAD XRAY COMPLETED NO FRACTURE, POSSIBLE TENDON ISSUE        PRIOR IMAGING:     [] : Patient is currently playing sports: no

## 2024-09-16 ENCOUNTER — APPOINTMENT (OUTPATIENT)
Dept: PAIN MANAGEMENT | Facility: CLINIC | Age: 64
End: 2024-09-16
Payer: COMMERCIAL

## 2024-09-16 DIAGNOSIS — M54.16 RADICULOPATHY, LUMBAR REGION: ICD-10-CM

## 2024-09-16 PROCEDURE — 99213 OFFICE O/P EST LOW 20 MIN: CPT

## 2024-09-16 NOTE — HISTORY OF PRESENT ILLNESS
[Full time] : Work status: full time [Home] : at home, [unfilled] , at the time of the visit. [Medical Office: ([facility])___] : at the medical office located in  [Verbal consent obtained from patient] : the patient, [unfilled] [FreeTextEntry1] : TIFFANIE CAMARGO IS FOLLOWING UP FOR PAIN MED REFILL, PT ADL'S INCREASE WITH MEDICATION PT IS MORE ACTIVE IN GENERAL AND HAS IMRPOVED QUALITY OF LIFE. PT IS ABLE TO COMPLETE ALL TYPE OF ADL     LAST UDS: 7/16/2024  PAIN LEVEL:8-10/10   DATE OF INJURY/ONSET: 1/5/2023    MECHANISM OF INJURY: UNKNOWN INJURY     QUALITY OF SYMPTOMS:  THROBBING      IMPROVES WITH:  ICE      WORSE WITH:  ALL TYPE OF ROM      PRIOR TREATMENT:  PT STATES WENT TO URGENT CARE 1/7/2023 PT HAD XRAY COMPLETED NO FRACTURE, POSSIBLE TENDON ISSUE        PRIOR IMAGING:     [] : Patient is currently playing sports: no

## 2024-10-11 ENCOUNTER — APPOINTMENT (OUTPATIENT)
Dept: PAIN MANAGEMENT | Facility: CLINIC | Age: 64
End: 2024-10-11
Payer: COMMERCIAL

## 2024-10-11 DIAGNOSIS — M54.16 RADICULOPATHY, LUMBAR REGION: ICD-10-CM

## 2024-10-11 PROCEDURE — 99213 OFFICE O/P EST LOW 20 MIN: CPT

## 2024-11-11 ENCOUNTER — APPOINTMENT (OUTPATIENT)
Dept: PAIN MANAGEMENT | Facility: CLINIC | Age: 64
End: 2024-11-11
Payer: COMMERCIAL

## 2024-11-11 DIAGNOSIS — M54.16 RADICULOPATHY, LUMBAR REGION: ICD-10-CM

## 2024-11-11 PROCEDURE — 99213 OFFICE O/P EST LOW 20 MIN: CPT

## 2024-11-28 ENCOUNTER — NON-APPOINTMENT (OUTPATIENT)
Age: 64
End: 2024-11-28

## 2024-12-11 ENCOUNTER — APPOINTMENT (OUTPATIENT)
Dept: PAIN MANAGEMENT | Facility: CLINIC | Age: 64
End: 2024-12-11
Payer: COMMERCIAL

## 2024-12-11 DIAGNOSIS — M54.16 RADICULOPATHY, LUMBAR REGION: ICD-10-CM

## 2024-12-11 PROCEDURE — 99213 OFFICE O/P EST LOW 20 MIN: CPT

## 2024-12-21 ENCOUNTER — NON-APPOINTMENT (OUTPATIENT)
Age: 64
End: 2024-12-21

## 2025-01-10 ENCOUNTER — APPOINTMENT (OUTPATIENT)
Dept: PAIN MANAGEMENT | Facility: CLINIC | Age: 65
End: 2025-01-10
Payer: COMMERCIAL

## 2025-01-10 DIAGNOSIS — M54.16 RADICULOPATHY, LUMBAR REGION: ICD-10-CM

## 2025-01-10 PROCEDURE — 99213 OFFICE O/P EST LOW 20 MIN: CPT

## 2025-01-16 ENCOUNTER — APPOINTMENT (OUTPATIENT)
Dept: SURGERY | Facility: CLINIC | Age: 65
End: 2025-01-16
Payer: COMMERCIAL

## 2025-01-16 VITALS
HEART RATE: 82 BPM | OXYGEN SATURATION: 99 % | SYSTOLIC BLOOD PRESSURE: 160 MMHG | TEMPERATURE: 97.1 F | RESPIRATION RATE: 17 BRPM | DIASTOLIC BLOOD PRESSURE: 89 MMHG

## 2025-01-16 DIAGNOSIS — K62.5 HEMORRHAGE OF ANUS AND RECTUM: ICD-10-CM

## 2025-01-16 DIAGNOSIS — K64.8 OTHER HEMORRHOIDS: ICD-10-CM

## 2025-01-16 PROCEDURE — 46600 DIAGNOSTIC ANOSCOPY SPX: CPT

## 2025-02-03 ENCOUNTER — APPOINTMENT (OUTPATIENT)
Dept: NEUROSURGERY | Facility: CLINIC | Age: 65
End: 2025-02-03

## 2025-02-10 ENCOUNTER — APPOINTMENT (OUTPATIENT)
Dept: PAIN MANAGEMENT | Facility: CLINIC | Age: 65
End: 2025-02-10
Payer: COMMERCIAL

## 2025-02-10 DIAGNOSIS — M54.16 RADICULOPATHY, LUMBAR REGION: ICD-10-CM

## 2025-02-10 PROCEDURE — 99212 OFFICE O/P EST SF 10 MIN: CPT | Mod: 93

## 2025-02-12 ENCOUNTER — APPOINTMENT (OUTPATIENT)
Dept: SURGERY | Facility: CLINIC | Age: 65
End: 2025-02-12

## 2025-03-10 ENCOUNTER — APPOINTMENT (OUTPATIENT)
Dept: PAIN MANAGEMENT | Facility: CLINIC | Age: 65
End: 2025-03-10
Payer: COMMERCIAL

## 2025-03-10 DIAGNOSIS — G89.4 CHRONIC PAIN SYNDROME: ICD-10-CM

## 2025-03-10 PROCEDURE — 99213 OFFICE O/P EST LOW 20 MIN: CPT | Mod: 93

## 2025-04-09 ENCOUNTER — APPOINTMENT (OUTPATIENT)
Dept: PAIN MANAGEMENT | Facility: CLINIC | Age: 65
End: 2025-04-09
Payer: COMMERCIAL

## 2025-04-09 DIAGNOSIS — M54.16 RADICULOPATHY, LUMBAR REGION: ICD-10-CM

## 2025-04-09 PROCEDURE — 99212 OFFICE O/P EST SF 10 MIN: CPT | Mod: 95

## 2025-04-11 ENCOUNTER — OUTPATIENT (OUTPATIENT)
Dept: OUTPATIENT SERVICES | Facility: HOSPITAL | Age: 65
LOS: 1 days | End: 2025-04-11
Payer: COMMERCIAL

## 2025-04-11 ENCOUNTER — APPOINTMENT (OUTPATIENT)
Dept: ULTRASOUND IMAGING | Facility: CLINIC | Age: 65
End: 2025-04-11
Payer: COMMERCIAL

## 2025-04-11 DIAGNOSIS — Z00.8 ENCOUNTER FOR OTHER GENERAL EXAMINATION: ICD-10-CM

## 2025-04-11 PROCEDURE — 76700 US EXAM ABDOM COMPLETE: CPT | Mod: 26

## 2025-04-11 PROCEDURE — 76700 US EXAM ABDOM COMPLETE: CPT

## 2025-04-15 ENCOUNTER — NON-APPOINTMENT (OUTPATIENT)
Age: 65
End: 2025-04-15

## 2025-05-07 ENCOUNTER — APPOINTMENT (OUTPATIENT)
Dept: PAIN MANAGEMENT | Facility: CLINIC | Age: 65
End: 2025-05-07
Payer: COMMERCIAL

## 2025-05-07 DIAGNOSIS — M54.16 RADICULOPATHY, LUMBAR REGION: ICD-10-CM

## 2025-05-07 PROCEDURE — 99213 OFFICE O/P EST LOW 20 MIN: CPT

## 2025-05-15 ENCOUNTER — APPOINTMENT (OUTPATIENT)
Dept: HEMATOLOGY ONCOLOGY | Facility: CLINIC | Age: 65
End: 2025-05-15

## 2025-06-02 ENCOUNTER — APPOINTMENT (OUTPATIENT)
Dept: PAIN MANAGEMENT | Facility: CLINIC | Age: 65
End: 2025-06-02
Payer: COMMERCIAL

## 2025-06-02 DIAGNOSIS — M54.16 RADICULOPATHY, LUMBAR REGION: ICD-10-CM

## 2025-06-02 DIAGNOSIS — G89.4 CHRONIC PAIN SYNDROME: ICD-10-CM

## 2025-06-02 PROCEDURE — 99212 OFFICE O/P EST SF 10 MIN: CPT | Mod: 95

## 2025-06-12 ENCOUNTER — NON-APPOINTMENT (OUTPATIENT)
Age: 65
End: 2025-06-12

## 2025-06-30 ENCOUNTER — APPOINTMENT (OUTPATIENT)
Dept: PAIN MANAGEMENT | Facility: CLINIC | Age: 65
End: 2025-06-30
Payer: COMMERCIAL

## 2025-06-30 PROCEDURE — 99212 OFFICE O/P EST SF 10 MIN: CPT | Mod: 93

## 2025-07-05 ENCOUNTER — NON-APPOINTMENT (OUTPATIENT)
Age: 65
End: 2025-07-05

## 2025-07-12 ENCOUNTER — NON-APPOINTMENT (OUTPATIENT)
Age: 65
End: 2025-07-12

## 2025-07-28 ENCOUNTER — APPOINTMENT (OUTPATIENT)
Dept: PAIN MANAGEMENT | Facility: CLINIC | Age: 65
End: 2025-07-28
Payer: COMMERCIAL

## 2025-07-28 DIAGNOSIS — M54.16 RADICULOPATHY, LUMBAR REGION: ICD-10-CM

## 2025-07-28 PROCEDURE — 99212 OFFICE O/P EST SF 10 MIN: CPT | Mod: 93

## 2025-08-06 ENCOUNTER — APPOINTMENT (OUTPATIENT)
Dept: PEDIATRIC MEDICAL GENETICS | Facility: CLINIC | Age: 65
End: 2025-08-06

## 2025-08-26 ENCOUNTER — APPOINTMENT (OUTPATIENT)
Dept: PAIN MANAGEMENT | Facility: CLINIC | Age: 65
End: 2025-08-26
Payer: COMMERCIAL

## 2025-08-26 VITALS — BODY MASS INDEX: 29.62 KG/M2 | HEIGHT: 69 IN | WEIGHT: 200 LBS

## 2025-08-26 DIAGNOSIS — M54.16 RADICULOPATHY, LUMBAR REGION: ICD-10-CM

## 2025-08-26 PROCEDURE — 99212 OFFICE O/P EST SF 10 MIN: CPT
